# Patient Record
Sex: FEMALE | Race: WHITE | Employment: PART TIME | URBAN - METROPOLITAN AREA
[De-identification: names, ages, dates, MRNs, and addresses within clinical notes are randomized per-mention and may not be internally consistent; named-entity substitution may affect disease eponyms.]

---

## 2019-07-16 ENCOUNTER — HOSPITAL ENCOUNTER (OUTPATIENT)
Dept: MAMMOGRAPHY | Age: 54
Discharge: HOME | End: 2019-07-16
Payer: OTHER GOVERNMENT

## 2019-07-16 DIAGNOSIS — Z12.39 BREAST CANCER SCREENING: ICD-10-CM

## 2019-07-16 PROCEDURE — 77067 SCR MAMMO BI INCL CAD: CPT

## 2019-07-31 ENCOUNTER — HOSPITAL ENCOUNTER (OUTPATIENT)
Dept: WOMENS IMAGING | Age: 54
Discharge: HOME | End: 2019-07-31
Payer: OTHER GOVERNMENT

## 2019-07-31 DIAGNOSIS — R92.8 ABNORMAL MAMMOGRAM: ICD-10-CM

## 2019-07-31 PROCEDURE — 76642 ULTRASOUND BREAST LIMITED: CPT

## 2019-07-31 PROCEDURE — 77066 DX MAMMO INCL CAD BI: CPT

## 2020-02-03 ENCOUNTER — HOSPITAL ENCOUNTER (OUTPATIENT)
Dept: WOMENS IMAGING | Age: 55
Discharge: HOME | End: 2020-02-03
Payer: OTHER GOVERNMENT

## 2020-02-03 PROCEDURE — G0279 TOMOSYNTHESIS, MAMMO: HCPCS

## 2020-08-07 ENCOUNTER — TELEPHONE (OUTPATIENT)
Dept: WOMENS IMAGING | Age: 55
End: 2020-08-07

## 2020-08-07 NOTE — TELEPHONE ENCOUNTER
Patient called to ask if she should reschedule mammogram as she wishes to not wear a mask. I told her it is our policy that we require all patients wear a mask. Patient voiced her dislike of this requirement.     Rafat White RT (R) (M)

## 2022-08-16 ENCOUNTER — APPOINTMENT (OUTPATIENT)
Dept: GENERAL RADIOLOGY | Age: 57
End: 2022-08-16
Payer: OTHER GOVERNMENT

## 2022-08-16 ENCOUNTER — HOSPITAL ENCOUNTER (EMERGENCY)
Age: 57
Discharge: HOME | End: 2022-08-16
Payer: OTHER GOVERNMENT

## 2022-08-16 VITALS
RESPIRATION RATE: 18 BRPM | SYSTOLIC BLOOD PRESSURE: 157 MMHG | HEIGHT: 70 IN | OXYGEN SATURATION: 96 % | TEMPERATURE: 97.6 F | DIASTOLIC BLOOD PRESSURE: 81 MMHG | HEART RATE: 86 BPM | BODY MASS INDEX: 29.35 KG/M2 | WEIGHT: 205 LBS

## 2022-08-16 DIAGNOSIS — M25.512 ACUTE PAIN OF LEFT SHOULDER: Primary | ICD-10-CM

## 2022-08-16 PROCEDURE — 99283 EMERGENCY DEPT VISIT LOW MDM: CPT

## 2022-08-16 PROCEDURE — 6370000000 HC RX 637 (ALT 250 FOR IP): Performed by: NURSE PRACTITIONER

## 2022-08-16 PROCEDURE — 73030 X-RAY EXAM OF SHOULDER: CPT

## 2022-08-16 RX ORDER — ASPIRIN 81 MG/1
81 TABLET, CHEWABLE ORAL DAILY
COMMUNITY

## 2022-08-16 RX ORDER — LOSARTAN POTASSIUM 25 MG/1
25 TABLET ORAL DAILY
COMMUNITY

## 2022-08-16 RX ORDER — NAPROXEN 250 MG/1
250 TABLET ORAL 2 TIMES DAILY WITH MEALS
Qty: 60 TABLET | Refills: 0 | Status: SHIPPED | OUTPATIENT
Start: 2022-08-16

## 2022-08-16 RX ORDER — HYDROCODONE BITARTRATE AND ACETAMINOPHEN 5; 325 MG/1; MG/1
1 TABLET ORAL ONCE
Status: COMPLETED | OUTPATIENT
Start: 2022-08-16 | End: 2022-08-16

## 2022-08-16 RX ORDER — CETIRIZINE HYDROCHLORIDE 10 MG/1
CAPSULE, LIQUID FILLED ORAL
COMMUNITY

## 2022-08-16 RX ADMIN — HYDROCODONE BITARTRATE AND ACETAMINOPHEN 1 TABLET: 5; 325 TABLET ORAL at 13:46

## 2022-08-16 ASSESSMENT — ENCOUNTER SYMPTOMS
BLOOD IN STOOL: 0
ABDOMINAL PAIN: 0
DIARRHEA: 0
RHINORRHEA: 0
NAUSEA: 0
EYE PAIN: 0
SORE THROAT: 0
COUGH: 0
BACK PAIN: 0
SHORTNESS OF BREATH: 0
VOMITING: 0

## 2022-08-16 ASSESSMENT — PAIN SCALES - GENERAL
PAINLEVEL_OUTOF10: 5
PAINLEVEL_OUTOF10: 8

## 2022-08-16 ASSESSMENT — PAIN DESCRIPTION - LOCATION: LOCATION: SHOULDER

## 2022-08-16 ASSESSMENT — PAIN DESCRIPTION - ORIENTATION: ORIENTATION: RIGHT

## 2022-08-16 ASSESSMENT — PAIN - FUNCTIONAL ASSESSMENT: PAIN_FUNCTIONAL_ASSESSMENT: 0-10

## 2022-08-16 NOTE — ED TRIAGE NOTES
[address] ED  EMERGENCY DEPARTMENT ENCOUNTER        Pt Name: Dayanna Sauceda  MRN: [id number]  [place] 1965  Date of evaluation: 8/16/2022  Provider: TIFFANY Holt - FILIBERTO  PCP: Waldemar ANTOINE  Note Started: 1:52 PM EDT      BEVERLY. I have evaluated this patient. My supervising physician was available for consultation. Triage CHIEF COMPLAINT       Chief Complaint   Patient presents with    Arm Pain     Via squad. Patient fell on a slippery sidewalk and landed on her right side, pain in right shoulder. Denies hitting head. HISTORY OF PRESENT ILLNESS   (Location/Symptom, Timing/Onset, Context/Setting, Quality, Duration, Modifying Factors, Severity)  Note limiting factors. Chief Complaint: Right shoulder pain after fall    Dayanna Sauceda is a 62 y.o. female who presents to the emergency department symptoms of right shoulder pain after a mechanical fall. Patient reported stepping over a curb and slipping causing her to fall to the ground. States that she landed on her right shoulder and since then had pain. Denies any symptoms of elbow pain or wrist pain. No numbness no tingling. States she does have pain with range of motion of the shoulder. Denies any head or neck injury. No neck pain. No chest or abdominal injury. No hip or pelvic pain. She is able to [facility] without difficulty. Nursing Notes were all reviewed and agreed with or any disagreements were addressed in the HPI. REVIEW OF SYSTEMS    (2-9 systems for level 4, 10 or more for level 5)     Review of Systems   Constitutional:  Negative for chills, diaphoresis and fever. HENT:  Negative for congestion, ear pain, rhinorrhea and sore throat. Eyes:  Negative for pain and visual disturbance. Respiratory:  Negative for cough and shortness of breath. Cardiovascular:  Negative for chest pain and leg swelling.    Gastrointestinal:  Negative for abdominal pain, blood in stool, diarrhea, nausea and vomiting. Genitourinary:  Negative for difficulty urinating, dysuria, flank pain and frequency. Musculoskeletal:  Negative for back pain and neck pain. Right shoulder pain      Skin:  Negative for rash and wound. Neurological:  Negative for dizziness and light-headedness. PAST MEDICAL HISTORY     Past Medical History:   Diagnosis Date    High blood pressure        SURGICAL HISTORY     Past Surgical History:   Procedure Laterality Date    BACK SURGERY      MANDIBLE FRACTURE SURGERY      SHOULDER SURGERY Left 01/01/2010    SHOULDER SURGERY Right 2015    TUBAL LIGATION         CURRENTMEDICATIONS       Previous Medications    ASCORBIC ACID (VITAMIN C) 250 MG TABLET    Take 500 mg by mouth daily    ASPIRIN 81 MG CHEWABLE TABLET    Take 81 mg by mouth in the morning. CETIRIZINE HCL (ZYRTEC ALLERGY) 10 MG CAPS    Take by mouth    LOSARTAN (COZAAR) 25 MG TABLET    Take 25 mg by mouth in the morning. NORCO 5-325 MG PER TABLET    Take 1 tablet by mouth every 6 hours as needed for Pain    NORCO 5-325 MG PER TABLET    Take 1 tablet by mouth every 6 hours as needed for Pain    PROBIOTIC PRODUCT (PRO-BIOTIC BLEND) CAPS    Take by mouth    TRIAMTERENE-HYDROCHLOROTHIAZIDE (MAXZIDE-25) 37.5-25 MG PER TABLET    Take 1 tablet by mouth daily    ZINC-VITAMIN C PO    Take by mouth       ALLERGIES     Pcn [penicillins]    FAMILYHISTORY     History reviewed. No pertinent family history.      SOCIAL HISTORY       Social History     Socioeconomic History    Marital status:      Spouse name: None    Number of children: None    Years of education: None    Highest education level: None   Tobacco Use    Smoking status: Never    Smokeless tobacco: Never   Substance and Sexual Activity    Alcohol use: Yes     Comment: Occasionally    Drug use: No       SCREENINGS    Ralph Coma Scale  Eye Opening: Spontaneous  Best Verbal Response: Oriented  Best Motor Response: Obeys commands  Ralph Coma Scale Score: 15 PHYSICAL EXAM    (up to 7 for level 4, 8 or more for level 5)     ED Triage Vitals [08/16/22 1324]   BP Temp Temp Source Heart Rate Resp SpO2 Height Weight   (!) 195/100 97.6 °F (36.4 °C) Oral 95 18 99 % 5' 10\" (1.778 m) 205 lb (93 kg)       Physical Exam  Vitals and nursing note reviewed. Constitutional:       Appearance: Normal appearance. She is not toxic-appearing or diaphoretic. HENT:      Head: Normocephalic and atraumatic. Nose: Nose normal.   Eyes:      General:         Right eye: No discharge. Left eye: No discharge. Cardiovascular:      Rate and Rhythm: Normal rate and regular rhythm. Pulmonary:      Effort: Pulmonary effort is normal. No respiratory distress. Breath sounds: No wheezing or rhonchi. Abdominal:      Palpations: Abdomen is soft. Tenderness: There is no abdominal tenderness. There is no guarding or rebound. Musculoskeletal:         General: Normal range of motion. Right shoulder: Tenderness present. No swelling or deformity. Normal pulse. Right elbow: Normal.      Left elbow: Normal.      Right wrist: Normal.      Left wrist: Normal.      Cervical back: Normal range of motion and neck supple. Right lower leg: No edema. Left lower leg: No edema. Comments: 2+ radial pulse right arm    Skin:     General: Skin is warm and dry. Capillary Refill: Capillary refill takes less than 2 seconds. Neurological:      General: No focal deficit present. Mental Status: She is alert and oriented to person, place, and time. Psychiatric:         Mood and Affect: Mood normal.         Behavior: Behavior normal.       DIAGNOSTIC RESULTS   LABS:    Labs Reviewed - No data to display    When ordered, only abnormal lab results are displayed. All other labs were within normal range or not returned as of this dictation. EKG:  When ordered, EKG's are interpreted by the Emergency Department Physician in the absence of a cardiologist.  Please see their note for interpretation of EKG. RADIOLOGY:   Non-plain film images such as CT, Ultrasound and MRI are read by the radiologist. Plain radiographic images are visualized andpreliminarily interpreted by the  ED Provider with the below findings:        Interpretation perthe Radiologist below, if available at the time of this note:    XR SHOULDER RIGHT (MIN 2 VIEWS)   Final Result   No acute abnormality. No results found. PROCEDURES   Unless otherwise noted below, none     Procedures    CRITICAL CARE TIME   N/A    CONSULTS:  None      EMERGENCY DEPARTMENT COURSE and DIFFERENTIAL DIAGNOSIS/MDM:   Vitals:    Vitals:    08/16/22 1324   BP: (!) 195/100   Pulse: 95   Resp: 18   Temp: 97.6 °F (36.4 °C)   TempSrc: Oral   SpO2: 99%   Weight: 205 lb (93 kg)   Height: 5' 10\" (1.778 m)       Patient was given thefollowing medications:  Medications   HYDROcodone-acetaminophen (NORCO) 5-325 MG per tablet 1 tablet (1 tablet Oral Given 8/16/22 1346)         Is this patient to be included in the SEP-1 Core Measure due to severe sepsis or septic shock? No   Exclusion criteria - the patient is NOT to be included for SEP-1 Core Measure due to: Infection is not suspected    Patient is noted to have a negative x-ray of the shoulder. The patient has no elbow pain or wrist pain. No rash intact. No obvious fracture. No malalignment. The patient will be treated with anti-inflammatories and shoulder sling. Did discuss with her on proper sling placement and the need for mild range of motion of the shoulder to prevent frozen shoulder. Patient provided follow-up with Ortho. Also advised to return back to the ED for any further acute concerns. Verbalized understanding and agrees. FINAL IMPRESSION      1.  Acute pain of left shoulder          DISPOSITION/PLAN   DISPOSITION Discharge - Pending Orders Complete 08/16/2022 02:23:07 PM      PATIENT REFERREDTO:  Abbie Newby    Go to       [address] [place], [address] [facility]  [phone]    Schedule an appointment as soon as possible for a visit       DISCHARGE MEDICATIONS:  New Prescriptions    NAPROXEN (NAPROSYN) 250 MG TABLET    Take 1 tablet by mouth in the morning and 1 tablet in the evening. Take with meals.        DISCONTINUED MEDICATIONS:  Discontinued Medications    MELOXICAM (MOBIC) 15 MG TABLET    Take 1 tablet by mouth daily Take one tab 15 mg by mouth daily with food              (Please note that portions ofthis note were completed with a voice recognition program.  Efforts were made to edit the dictations but occasionally words are mis-transcribed.)    TIFFANY Guerrero CNP (electronically signed)

## 2022-08-16 NOTE — ED NOTES
Discharge instructions reviewed with patient. Patient verbalized understanding. All home medications have been reviewed, questions answered and patient voiced understanding. Given prescriptions, discharge instructions, and appointment times. Sling applied to right arm.       Naheed Bunn RN  08/16/22 5211

## 2022-08-16 NOTE — ED PROVIDER NOTES
[facility] [address] ED  EMERGENCY DEPARTMENT ENCOUNTER          Pt Name: Theresa Bejarano  MRN: [id number]  [place] 1965  Date of evaluation: 8/16/2022  Provider: TIFFANY Bennett CNP  PCP: Nellie ANTOINE  Note Started: 1:52 PM EDT        BEVERLY. I have evaluated this patient. My supervising physician was available for consultation. Triage CHIEF COMPLAINT             Chief Complaint   Patient presents with    Arm Pain       Via squad. Patient fell on a slippery sidewalk and landed on her right side, pain in right shoulder. Denies hitting head. HISTORY OF PRESENT ILLNESS   (Location/Symptom, Timing/Onset, Context/Setting, Quality, Duration, Modifying Factors, Severity)  Note limiting factors. Chief Complaint: Right shoulder pain after fall     Theresa Bejarano is a 62 y.o. female who presents to the emergency department symptoms of right shoulder pain after a mechanical fall. Patient reported stepping over a curb and slipping causing her to fall to the ground. States that she landed on her right shoulder and since then had pain. Denies any symptoms of elbow pain or wrist pain.   No numbness no tingling. States she does have pain with range of motion of the shoulder. Denies any head or neck injury. No neck pain. No chest or abdominal injury. No hip or pelvic pain. She is able to [facility] without difficulty. Nursing Notes were all reviewed and agreed with or any disagreements were addressed in the HPI. REVIEW OF SYSTEMS    (2-9 systems for level 4, 10 or more for level 5)      Review of Systems  Constitutional:  Negative for chills, diaphoresis and fever. HENT:  Negative for congestion, ear pain, rhinorrhea and sore throat. Eyes:  Negative for pain and visual disturbance. Respiratory:  Negative for cough and shortness of breath. Cardiovascular:  Negative for chest pain and leg swelling. Gastrointestinal:  Negative for abdominal pain, blood in stool, diarrhea, nausea and vomiting. Genitourinary:  Negative for difficulty urinating, dysuria, flank pain and frequency. Musculoskeletal:  Negative for back pain and neck pain. Right shoulder pain      Skin:  Negative for rash and wound. Neurological:  Negative for dizziness and light-headedness. PAST MEDICAL HISTORY      Past Medical History        Past Medical History:   Diagnosis Date    High blood pressure              SURGICAL HISTORY      Past Surgical History         Past Surgical History:   Procedure Laterality Date    BACK SURGERY        MANDIBLE FRACTURE SURGERY        SHOULDER SURGERY Left 01/01/2010    SHOULDER SURGERY Right 2015    TUBAL LIGATION                CURRENTMEDICATIONS             Previous Medications     ASCORBIC ACID (VITAMIN C) 250 MG TABLET    Take 500 mg by mouth daily     ASPIRIN 81 MG CHEWABLE TABLET    Take 81 mg by mouth in the morning. CETIRIZINE HCL (ZYRTEC ALLERGY) 10 MG CAPS    Take by mouth     LOSARTAN (COZAAR) 25 MG TABLET    Take 25 mg by mouth in the morning.      NORCO 5-325 MG PER TABLET    Take 1 tablet by mouth every 6 hours as needed for Pain     NORCO 5-325 MG PER TABLET    Take 1 tablet by mouth every 6 hours as needed for Pain     PROBIOTIC PRODUCT (PRO-BIOTIC BLEND) CAPS    Take by mouth     TRIAMTERENE-HYDROCHLOROTHIAZIDE (MAXZIDE-25) 37.5-25 MG PER TABLET    Take 1 tablet by mouth daily     ZINC-VITAMIN C PO    Take by mouth         ALLERGIES     Pcn [penicillins]     FAMILYHISTORY     Family History   History reviewed. No pertinent family history. SOCIAL HISTORY        Social History   Social History            Socioeconomic History    Marital status:        Spouse name: None    Number of children: None    Years of education: None    Highest education level: None   Tobacco Use    Smoking status: Never    Smokeless tobacco: Never   Substance and Sexual Activity    Alcohol use: Yes       Comment: Occasionally    Drug use: No            SCREENINGS    [place] Coma Scale  Eye Opening: Spontaneous  Best Verbal Response: Oriented  Best Motor Response: Obeys commands  Ralph Coma Scale Score: 15          PHYSICAL EXAM    (up to 7 for level 4, 8 or more for level 5)                ED Triage Vitals [08/16/22 1324]   BP Temp Temp Source Heart Rate Resp SpO2 Height Weight   (!) 195/100 97.6 °F (36.4 °C) Oral 95 18 99 % 5' 10\" (1.778 m) 205 lb (93 kg)         Physical Exam  Vitals and nursing note reviewed. Constitutional:       Appearance: Normal appearance. She is not toxic-appearing or diaphoretic. HENT:     Head: Normocephalic and atraumatic. Nose: Nose normal.  Eyes:     General:         Right eye: No discharge. Left eye: No discharge. Cardiovascular:     Rate and Rhythm: Normal rate and regular rhythm. Pulmonary:     Effort: Pulmonary effort is normal. No respiratory distress. Breath sounds: No wheezing or rhonchi. Abdominal:     Palpations: Abdomen is soft. Tenderness: There is no abdominal tenderness. There is no guarding or rebound. Musculoskeletal:         General: Normal range of motion. Right shoulder: Tenderness present.  No swelling or deformity. Normal pulse. Right elbow: Normal.     Left elbow: Normal.     Right wrist: Normal.     Left wrist: Normal.     Cervical back: Normal range of motion and neck supple. Right lower leg: No edema. Left lower leg: No edema. Comments: 2+ radial pulse right arm    Skin:     General: Skin is warm and dry. Capillary Refill: Capillary refill takes less than 2 seconds. Neurological:     General: No focal deficit present. Mental Status: She is alert and oriented to person, place, and time. Psychiatric:         Mood and Affect: Mood normal.         Behavior: Behavior normal.        DIAGNOSTIC RESULTS   LABS:     Labs Reviewed - No data to display     When ordered, only abnormal lab results are displayed. All other labs were within normal range or not returned as of this dictation. EKG: When ordered, EKG's are interpreted by the Emergency Department Physician in the absence of a cardiologist.  Please see their note for interpretation of EKG. RADIOLOGY:  Non-plain film images such as CT, Ultrasound and MRI are read by the radiologist. Plain radiographic images are visualized andpreliminarily interpreted by the  ED Provider with the below findings:           Interpretation [facility] Radiologist below, if available at the time of this note:     XR SHOULDER RIGHT (MIN 2 VIEWS)   Final Result   No acute abnormality. No results found.         PROCEDURES   Unless otherwise noted below, none     Procedures     CRITICAL CARE TIME   N/A     CONSULTS:  None        EMERGENCY DEPARTMENT COURSE and DIFFERENTIAL DIAGNOSIS/MDM:   Vitals:    Vitals       Vitals:     08/16/22 1324   BP: (!) 195/100   Pulse: 95   Resp: 18   Temp: 97.6 °F (36.4 °C)   TempSrc: Oral   SpO2: 99%   Weight: 205 lb (93 kg)   Height: 5' 10\" (1.778 m)            Patient was given thefollowing medications:  Medications   HYDROcodone-acetaminophen (NORCO) 5-325 MG per tablet 1 tablet (1 tablet Oral Given 8/16/22 1346) Is this patient to be included in the SEP-1 Core Measure due to severe sepsis or septic shock? No   Exclusion criteria - the patient is NOT to be included for SEP-1 Core Measure due to: Infection is not suspected     Patient is noted to have a negative x-ray of the shoulder. The patient has no elbow pain or wrist pain. No rash intact. No obvious fracture. No malalignment. The patient will be treated with anti-inflammatories and shoulder sling. Did discuss with her on proper sling placement and the need for mild range of motion of the shoulder to prevent frozen shoulder. Patient provided follow-up with Ortho. Also advised to return back to the ED for any further acute concerns. Verbalized understanding and agrees. FINAL IMPRESSION       1. Acute pain of left shoulder           DISPOSITION/PLAN   DISPOSITION Discharge - Pending Orders Complete 08/16/2022 02:23:07 PM        PATIENT REFERREDTO:  Dale ALVARADO Keyonna     Go to        [facility], [address]  [address] [address]   [phone]     Schedule an appointment as soon as possible for a visit         DISCHARGE MEDICATIONS:       New Prescriptions     NAPROXEN (NAPROSYN) 250 MG TABLET    Take 1 tablet by mouth in the morning and 1 tablet in the evening. Take with meals.          DISCONTINUED MEDICATIONS:       Discontinued Medications     MELOXICAM (MOBIC) 15 MG TABLET    Take 1 tablet by mouth daily Take one tab 15 mg by mouth daily with food               (Please note that portions ofthis note were completed with a voice recognition program.  Efforts were made to edit the dictations but occasionally words are mis-transcribed.)     TIFFANY Coleman CNP (electronically signed)                       TIFFANY Coleman CNP  08/16/22 6048

## 2022-08-16 NOTE — Clinical Note
Vasiliy Stanley was seen and treated in our emergency department on 8/16/2022. She may return to work on 08/18/2022. If you have any questions or concerns, please don't hesitate to call.       Jan Hurd, TIFFANY - CNP

## 2025-06-04 ENCOUNTER — OFFICE VISIT (OUTPATIENT)
Dept: INTERNAL MEDICINE CLINIC | Age: 60
End: 2025-06-04
Payer: COMMERCIAL

## 2025-06-04 VITALS
DIASTOLIC BLOOD PRESSURE: 80 MMHG | SYSTOLIC BLOOD PRESSURE: 128 MMHG | BODY MASS INDEX: 30.92 KG/M2 | WEIGHT: 216 LBS | HEIGHT: 70 IN

## 2025-06-04 DIAGNOSIS — G89.29 CHRONIC RIGHT-SIDED LOW BACK PAIN WITHOUT SCIATICA: Primary | Chronic | ICD-10-CM

## 2025-06-04 DIAGNOSIS — I10 ESSENTIAL HYPERTENSION: Chronic | ICD-10-CM

## 2025-06-04 DIAGNOSIS — M54.50 CHRONIC RIGHT-SIDED LOW BACK PAIN WITHOUT SCIATICA: Primary | Chronic | ICD-10-CM

## 2025-06-04 DIAGNOSIS — J30.2 SEASONAL ALLERGIES: ICD-10-CM

## 2025-06-04 DIAGNOSIS — R91.1 LEFT LOWER LOBE PULMONARY NODULE: Chronic | ICD-10-CM

## 2025-06-04 DIAGNOSIS — Z82.49 FAMILY HISTORY OF HEART DISEASE: Chronic | ICD-10-CM

## 2025-06-04 DIAGNOSIS — Z00.00 PREVENTATIVE HEALTH CARE: ICD-10-CM

## 2025-06-04 PROCEDURE — G8417 CALC BMI ABV UP PARAM F/U: HCPCS | Performed by: HOSPITALIST

## 2025-06-04 PROCEDURE — 99205 OFFICE O/P NEW HI 60 MIN: CPT | Performed by: HOSPITALIST

## 2025-06-04 PROCEDURE — 3017F COLORECTAL CA SCREEN DOC REV: CPT | Performed by: HOSPITALIST

## 2025-06-04 PROCEDURE — 4004F PT TOBACCO SCREEN RCVD TLK: CPT | Performed by: HOSPITALIST

## 2025-06-04 PROCEDURE — 3079F DIAST BP 80-89 MM HG: CPT | Performed by: HOSPITALIST

## 2025-06-04 PROCEDURE — 3074F SYST BP LT 130 MM HG: CPT | Performed by: HOSPITALIST

## 2025-06-04 PROCEDURE — G2211 COMPLEX E/M VISIT ADD ON: HCPCS | Performed by: HOSPITALIST

## 2025-06-04 PROCEDURE — G8427 DOCREV CUR MEDS BY ELIG CLIN: HCPCS | Performed by: HOSPITALIST

## 2025-06-04 RX ORDER — LOSARTAN POTASSIUM 25 MG/1
25 TABLET ORAL DAILY
Qty: 90 TABLET | Refills: 3 | Status: SHIPPED | OUTPATIENT
Start: 2025-06-04

## 2025-06-04 RX ORDER — CETIRIZINE HYDROCHLORIDE 10 MG/1
10 CAPSULE, LIQUID FILLED ORAL DAILY
Qty: 90 CAPSULE | Refills: 3 | Status: SHIPPED | OUTPATIENT
Start: 2025-06-04

## 2025-06-04 SDOH — ECONOMIC STABILITY: FOOD INSECURITY: WITHIN THE PAST 12 MONTHS, YOU WORRIED THAT YOUR FOOD WOULD RUN OUT BEFORE YOU GOT MONEY TO BUY MORE.: NEVER TRUE

## 2025-06-04 SDOH — ECONOMIC STABILITY: FOOD INSECURITY: WITHIN THE PAST 12 MONTHS, THE FOOD YOU BOUGHT JUST DIDN'T LAST AND YOU DIDN'T HAVE MONEY TO GET MORE.: NEVER TRUE

## 2025-06-04 ASSESSMENT — PATIENT HEALTH QUESTIONNAIRE - PHQ9
SUM OF ALL RESPONSES TO PHQ QUESTIONS 1-9: 0
SUM OF ALL RESPONSES TO PHQ QUESTIONS 1-9: 0
2. FEELING DOWN, DEPRESSED OR HOPELESS: NOT AT ALL
SUM OF ALL RESPONSES TO PHQ QUESTIONS 1-9: 0
SUM OF ALL RESPONSES TO PHQ QUESTIONS 1-9: 0
1. LITTLE INTEREST OR PLEASURE IN DOING THINGS: NOT AT ALL

## 2025-06-04 ASSESSMENT — ENCOUNTER SYMPTOMS
BACK PAIN: 1
SINUS PAIN: 0
VOMITING: 0
NAUSEA: 0
SHORTNESS OF BREATH: 0
VOICE CHANGE: 0
BLOOD IN STOOL: 0
SORE THROAT: 0
ABDOMINAL PAIN: 0
WHEEZING: 0
SINUS PRESSURE: 0
COUGH: 0
ABDOMINAL DISTENTION: 0
DIARRHEA: 0
CONSTIPATION: 0
CHEST TIGHTNESS: 0
TROUBLE SWALLOWING: 0

## 2025-06-04 NOTE — PROGRESS NOTES
[facility]  Establish care visit   2025    Lori New (:  1965) is a 60 y.o. female, here to establish care.    No chief complaint on file.       Patient Active Problem List   Diagnosis    Tendinitis of right rotator cuff    Biceps tendinitis on right    Arthritis of right acromioclavicular joint    Neck muscle spasm    Neck arthritis C5, C6, C7    Right rotator cuff tear       HPI    The patient is here to establish care with me.  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  The patient is unmarried.  She has 3 adult children.  Her youngest daughter age 25 lives with her.  The patient is a never smoker.  The patient drinks alcohol perhaps once monthly.  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          Chronic problem  Chronic right lower back pain  The patient complains of right lower back pain.  This is a chronic intermittent problem, but the patient is experiencing a current flareup.  It may have been precipitated by some chiropractic manipulation done on 2025.  The patient is describing a nonradiating throbbing pain when seated.  This can be a sharp shooting pain with certain musculoskeletal position change but it does not involve the legs.  The patient has not tried any ibuprofen, Aleve, Tylenol, or other medications for the pain.  The patient has tried alternating ice and heat.  She is also tried a TENS unit.  Both treatments are somewhat helpful.  Essential hypertension  This is a chronic problem.  The disease course is stable.  Current therapies include lifestyle modification and pharmacologic therapy.  The patient is using losartan 25 mg p.o. daily without side effect..  Pertinent negatives include no leg swelling, headaches, visual problems.    Family history of heart disease  Mother  of a

## 2025-07-07 DIAGNOSIS — J30.2 SEASONAL ALLERGIES: ICD-10-CM

## 2025-07-07 DIAGNOSIS — I10 ESSENTIAL HYPERTENSION: Chronic | ICD-10-CM

## 2025-07-07 RX ORDER — CETIRIZINE HYDROCHLORIDE 10 MG/1
10 CAPSULE, LIQUID FILLED ORAL DAILY
Qty: 90 CAPSULE | Refills: 3 | Status: CANCELLED | OUTPATIENT
Start: 2025-07-07

## 2025-07-07 RX ORDER — LOSARTAN POTASSIUM 25 MG/1
25 TABLET ORAL DAILY
Qty: 90 TABLET | Refills: 3 | Status: CANCELLED | OUTPATIENT
Start: 2025-07-07

## 2025-07-08 ENCOUNTER — TELEPHONE (OUTPATIENT)
Dept: INTERNAL MEDICINE CLINIC | Age: 60
End: 2025-07-08

## 2025-07-08 DIAGNOSIS — J30.2 SEASONAL ALLERGIES: ICD-10-CM

## 2025-07-08 DIAGNOSIS — I10 ESSENTIAL HYPERTENSION: Chronic | ICD-10-CM

## 2025-07-08 RX ORDER — CETIRIZINE HYDROCHLORIDE 10 MG/1
10 CAPSULE, LIQUID FILLED ORAL DAILY
Qty: 90 CAPSULE | Refills: 1 | Status: SHIPPED | OUTPATIENT
Start: 2025-07-08

## 2025-07-08 RX ORDER — LOSARTAN POTASSIUM 25 MG/1
25 TABLET ORAL DAILY
Qty: 90 TABLET | Refills: 1 | Status: SHIPPED | OUTPATIENT
Start: 2025-07-08

## 2025-07-08 NOTE — TELEPHONE ENCOUNTER
Pt called upset due to her medication request from yesterday not being sent in to the pharmacy yet. Pt is out of medication and this I her blood pressure medication. Pt is requesting to speak with a supervisor about this. Pt was advised that doctor gets 24 to 48 hours to fill a medication question. Pt was also advised of the message left by MA on the the refill request from yesterday.     [phone]      losartan (COZAAR) 25 MG tablet [[id number]]     Cetirizine HCl (ZYRTEC ALLERGY) 10 MG CAPS [[id number]]

## 2025-07-21 ENCOUNTER — HOSPITAL ENCOUNTER (EMERGENCY)
Age: 60
Discharge: HOME | End: 2025-07-21
Attending: EMERGENCY MEDICINE
Payer: COMMERCIAL

## 2025-07-21 ENCOUNTER — APPOINTMENT (OUTPATIENT)
Dept: GENERAL RADIOLOGY | Age: 60
End: 2025-07-21
Payer: COMMERCIAL

## 2025-07-21 ENCOUNTER — OFFICE VISIT (OUTPATIENT)
Age: 60
End: 2025-07-21

## 2025-07-21 ENCOUNTER — TELEPHONE (OUTPATIENT)
Dept: INTERNAL MEDICINE CLINIC | Age: 60
End: 2025-07-21

## 2025-07-21 ENCOUNTER — APPOINTMENT (OUTPATIENT)
Dept: CT IMAGING | Age: 60
End: 2025-07-21
Payer: COMMERCIAL

## 2025-07-21 VITALS
SYSTOLIC BLOOD PRESSURE: 150 MMHG | TEMPERATURE: 97.7 F | BODY MASS INDEX: 32.07 KG/M2 | HEIGHT: 69 IN | WEIGHT: 216.5 LBS | OXYGEN SATURATION: 98 % | RESPIRATION RATE: 16 BRPM | DIASTOLIC BLOOD PRESSURE: 90 MMHG | HEART RATE: 68 BPM

## 2025-07-21 DIAGNOSIS — R10.84 GENERALIZED ABDOMINAL PAIN: Primary | ICD-10-CM

## 2025-07-21 DIAGNOSIS — R10.11 RUQ ABDOMINAL PAIN: Primary | ICD-10-CM

## 2025-07-21 DIAGNOSIS — R03.0 ELEVATED BLOOD PRESSURE READING WITHOUT DIAGNOSIS OF HYPERTENSION: ICD-10-CM

## 2025-07-21 DIAGNOSIS — M54.50 MIDLINE LOW BACK PAIN WITHOUT SCIATICA, UNSPECIFIED CHRONICITY: ICD-10-CM

## 2025-07-21 DIAGNOSIS — R10.11 ABDOMINAL PAIN, RIGHT UPPER QUADRANT: ICD-10-CM

## 2025-07-21 LAB
ALBUMIN SERPL-MCNC: 3.9 G/DL (ref 3.4–5)
ALBUMIN/GLOB SERPL: 1.1 {RATIO} (ref 1.1–2.2)
ALP SERPL-CCNC: 93 U/L (ref 40–129)
ALT SERPL-CCNC: 26 U/L (ref 10–40)
ANION GAP SERPL CALCULATED.3IONS-SCNC: 11 MMOL/L (ref 3–16)
AST SERPL-CCNC: 25 U/L (ref 15–37)
BACTERIA URNS QL MICRO: ABNORMAL /HPF
BASOPHILS # BLD: 0.1 K/UL (ref 0–0.2)
BASOPHILS NFR BLD: 0.7 %
BILIRUB SERPL-MCNC: 0.3 MG/DL (ref 0–1)
BILIRUB UR QL STRIP.AUTO: NEGATIVE
BILIRUBIN, POC: NEGATIVE
BLOOD URINE, POC: NEGATIVE
BUN SERPL-MCNC: 13 MG/DL (ref 7–20)
CALCIUM SERPL-MCNC: 8.7 MG/DL (ref 8.3–10.6)
CHLORIDE SERPL-SCNC: 105 MMOL/L (ref 99–110)
CLARITY UR: CLEAR
CLARITY, POC: CLEAR
CO2 SERPL-SCNC: 23 MMOL/L (ref 21–32)
COLOR UR: YELLOW
COLOR, POC: YELLOW
CREAT SERPL-MCNC: 0.7 MG/DL (ref 0.6–1.2)
DEPRECATED RDW RBC AUTO: 14.3 % (ref 12.4–15.4)
EOSINOPHIL # BLD: 0.5 K/UL (ref 0–0.6)
EOSINOPHIL NFR BLD: 6.6 %
EPI CELLS #/AREA URNS HPF: ABNORMAL /HPF (ref 0–5)
GFR SERPLBLD CREATININE-BSD FMLA CKD-EPI: >90 ML/MIN/{1.73_M2}
GLUCOSE SERPL-MCNC: 81 MG/DL (ref 70–99)
GLUCOSE UR STRIP.AUTO-MCNC: NEGATIVE MG/DL
GLUCOSE URINE, POC: NEGATIVE MG/DL
HCT VFR BLD AUTO: 42.4 % (ref 36–48)
HGB BLD-MCNC: 14.2 G/DL (ref 12–16)
HGB UR QL STRIP.AUTO: NEGATIVE
KETONES UR STRIP.AUTO-MCNC: NEGATIVE MG/DL
KETONES, POC: NEGATIVE MG/DL
LEUKOCYTE EST, POC: NEGATIVE
LEUKOCYTE ESTERASE UR QL STRIP.AUTO: NEGATIVE
LIPASE SERPL-CCNC: 76 U/L (ref 13–60)
LYMPHOCYTES # BLD: 2.1 K/UL (ref 1–5.1)
LYMPHOCYTES NFR BLD: 27.8 %
MCH RBC QN AUTO: 30 PG (ref 26–34)
MCHC RBC AUTO-ENTMCNC: 33.5 G/DL (ref 31–36)
MCV RBC AUTO: 89.4 FL (ref 80–100)
MONOCYTES # BLD: 0.6 K/UL (ref 0–1.3)
MONOCYTES NFR BLD: 7.9 %
MUCOUS THREADS #/AREA URNS LPF: ABNORMAL /LPF
NEUTROPHILS # BLD: 4.3 K/UL (ref 1.7–7.7)
NEUTROPHILS NFR BLD: 57 %
NITRITE UR QL STRIP.AUTO: NEGATIVE
NITRITE, POC: NEGATIVE
PH UR STRIP.AUTO: 6 [PH] (ref 5–8)
PH, POC: 5.5
PLATELET # BLD AUTO: 230 K/UL (ref 135–450)
PMV BLD AUTO: 8.5 FL (ref 5–10.5)
POTASSIUM SERPL-SCNC: 4.3 MMOL/L (ref 3.5–5.1)
PROT SERPL-MCNC: 7.3 G/DL (ref 6.4–8.2)
PROT UR STRIP.AUTO-MCNC: NEGATIVE MG/DL
PROTEIN, POC: NEGATIVE MG/DL
RBC # BLD AUTO: 4.74 M/UL (ref 4–5.2)
RBC #/AREA URNS HPF: ABNORMAL /HPF (ref 0–4)
SODIUM SERPL-SCNC: 139 MMOL/L (ref 136–145)
SP GR UR STRIP.AUTO: 1.01 (ref 1–1.03)
SPECIFIC GRAVITY, POC: 1.01
TROPONIN, HIGH SENSITIVITY: <6 NG/L (ref 0–14)
UA DIPSTICK W REFLEX MICRO PNL UR: ABNORMAL
URN SPEC COLLECT METH UR: ABNORMAL
UROBILINOGEN UR STRIP-ACNC: 0.2 E.U./DL
UROBILINOGEN, POC: 0.2 MG/DL
WBC # BLD AUTO: 7.6 K/UL (ref 4–11)
WBC #/AREA URNS HPF: ABNORMAL /HPF (ref 0–5)

## 2025-07-21 PROCEDURE — 85025 COMPLETE CBC W/AUTO DIFF WBC: CPT

## 2025-07-21 PROCEDURE — 96374 THER/PROPH/DIAG INJ IV PUSH: CPT

## 2025-07-21 PROCEDURE — 6360000002 HC RX W HCPCS: Performed by: EMERGENCY MEDICINE

## 2025-07-21 PROCEDURE — 84484 ASSAY OF TROPONIN QUANT: CPT

## 2025-07-21 PROCEDURE — 71045 X-RAY EXAM CHEST 1 VIEW: CPT

## 2025-07-21 PROCEDURE — 81001 URINALYSIS AUTO W/SCOPE: CPT

## 2025-07-21 PROCEDURE — 83690 ASSAY OF LIPASE: CPT

## 2025-07-21 PROCEDURE — 74177 CT ABD & PELVIS W/CONTRAST: CPT

## 2025-07-21 PROCEDURE — 99285 EMERGENCY DEPT VISIT HI MDM: CPT

## 2025-07-21 PROCEDURE — 6360000004 HC RX CONTRAST MEDICATION: Performed by: EMERGENCY MEDICINE

## 2025-07-21 PROCEDURE — 80053 COMPREHEN METABOLIC PANEL: CPT

## 2025-07-21 PROCEDURE — 93005 ELECTROCARDIOGRAM TRACING: CPT | Performed by: EMERGENCY MEDICINE

## 2025-07-21 PROCEDURE — 93010 ELECTROCARDIOGRAM REPORT: CPT | Performed by: INTERNAL MEDICINE

## 2025-07-21 RX ORDER — IOPAMIDOL 755 MG/ML
75 INJECTION, SOLUTION INTRAVASCULAR
Status: COMPLETED | OUTPATIENT
Start: 2025-07-21 | End: 2025-07-21

## 2025-07-21 RX ORDER — CIPROFLOXACIN 500 MG/1
500 TABLET, FILM COATED ORAL 2 TIMES DAILY
Qty: 14 TABLET | Refills: 0 | Status: SHIPPED | OUTPATIENT
Start: 2025-07-21 | End: 2025-07-28

## 2025-07-21 RX ORDER — KETOROLAC TROMETHAMINE 15 MG/ML
30 INJECTION, SOLUTION INTRAMUSCULAR; INTRAVENOUS ONCE
Status: COMPLETED | OUTPATIENT
Start: 2025-07-21 | End: 2025-07-21

## 2025-07-21 RX ORDER — FLUCONAZOLE 100 MG/1
100 TABLET ORAL DAILY
Qty: 3 TABLET | Refills: 0 | Status: SHIPPED | OUTPATIENT
Start: 2025-07-21 | End: 2025-07-24

## 2025-07-21 RX ORDER — ONDANSETRON 4 MG/1
4 TABLET, FILM COATED ORAL 3 TIMES DAILY PRN
Qty: 15 TABLET | Refills: 0 | Status: SHIPPED | OUTPATIENT
Start: 2025-07-21

## 2025-07-21 RX ORDER — METOCLOPRAMIDE HYDROCHLORIDE 5 MG/ML
10 INJECTION INTRAMUSCULAR; INTRAVENOUS ONCE
Status: DISCONTINUED | OUTPATIENT
Start: 2025-07-21 | End: 2025-07-21 | Stop reason: HOSPADM

## 2025-07-21 RX ORDER — DIPHENHYDRAMINE HYDROCHLORIDE 50 MG/ML
25 INJECTION, SOLUTION INTRAMUSCULAR; INTRAVENOUS ONCE
Status: DISCONTINUED | OUTPATIENT
Start: 2025-07-21 | End: 2025-07-21 | Stop reason: HOSPADM

## 2025-07-21 RX ORDER — METRONIDAZOLE 500 MG/1
500 TABLET ORAL 2 TIMES DAILY
Qty: 14 TABLET | Refills: 0 | Status: SHIPPED | OUTPATIENT
Start: 2025-07-21 | End: 2025-07-28

## 2025-07-21 RX ORDER — OXYCODONE AND ACETAMINOPHEN 5; 325 MG/1; MG/1
1 TABLET ORAL EVERY 6 HOURS PRN
Qty: 12 TABLET | Refills: 0 | Status: SHIPPED | OUTPATIENT
Start: 2025-07-21 | End: 2025-07-24

## 2025-07-21 RX ADMIN — IOPAMIDOL 75 ML: 755 INJECTION, SOLUTION INTRAVENOUS at 17:27

## 2025-07-21 RX ADMIN — KETOROLAC TROMETHAMINE 30 MG: 15 INJECTION, SOLUTION INTRAMUSCULAR; INTRAVENOUS at 17:10

## 2025-07-21 ASSESSMENT — ENCOUNTER SYMPTOMS
CHEST TIGHTNESS: 1
ABDOMINAL PAIN: 1
BACK PAIN: 1
ABDOMINAL DISTENTION: 1

## 2025-07-21 ASSESSMENT — PAIN SCALES - GENERAL: PAINLEVEL_OUTOF10: 7

## 2025-07-21 ASSESSMENT — PAIN DESCRIPTION - DESCRIPTORS: DESCRIPTORS: PRESSURE

## 2025-07-21 ASSESSMENT — PAIN - FUNCTIONAL ASSESSMENT: PAIN_FUNCTIONAL_ASSESSMENT: 0-10

## 2025-07-21 ASSESSMENT — PAIN DESCRIPTION - LOCATION: LOCATION: ABDOMEN;BACK

## 2025-07-21 ASSESSMENT — PAIN DESCRIPTION - ORIENTATION: ORIENTATION: LEFT

## 2025-07-21 NOTE — PROGRESS NOTES
Lori New (: 1965) is a 60 y.o. female, New patient, here for evaluation of the following chief complaint(s):  Back Pain (Pt states feels like her kidneys are painful/Low back pain/X several months/Pt states has been seen for this issue and was told it was her back and was given muscle relaxers /Pt denies urinary sx ) and Abdominal Pain (Pt states for 9-months has had epigastric pressure too)        ASSESSMENT/PLAN:    ICD-10-CM    1. RUQ abdominal pain  R10.11       2. Midline low back pain without sciatica, unspecified chronicity  M54.50 POCT Urinalysis no Micro      3. Elevated blood pressure reading without diagnosis of hypertension  R03.0           Given patient presentation with c/o abdominal pain,flank pain, intermittent indigestion, back pain worse when supine and exam findings of RUQ abdominal pain that radiates to the back and left, a strong positive Quinteros's sign there is a strong suspicion for cholelithiasis. A urinalysis was ordered without concern for UTI. Based on history and physical examination, differential diagnosis includes but is not limited to: Cholecystitis, pancreatitis, hepatitis, pyelonephritis, diverticulitis, bowel obstruction. Given limited resources in the urgent care setting, decision to escalate care to ER made with patient agreeable to plan of care. Offered squad, patient declined. Report called to [facility], [place].         SUBJECTIVE/OBJECTIVE:  HPI:   60 y.o. female presents for complaint of \"my pain in back,kidneys, indigestion upper abdominal pain with pressure but no acid reflux\" for 9 months with some days worse than others. Reports has ongoing kidney issues and is worried that it is related to that, was told in the past that it was muscular and treated for back pain which did not resolve in any way    Admits symptoms include  abdominal pressure, bloating, indigestion, left flank pain  Denies dysuria, trauma, injury, nausea,vomiting,diarrhea

## 2025-07-21 NOTE — ED PROVIDER NOTES
I am not the primary provider of record, however I did receive report from [facility] urgent care via phone at 0529 on 7/21/2025.  Urgent care provider reports that this patient has been endorsing right upper quadrant pain that radiates across the diaphragm, is worse when she eats.  Pain is associated with distention, as well as left flank pain that is worse when laying flat.  Urgent care provider was able to elicit a strong positive Quinteros sign on exam, and had concern for gallbladder processes or ductal obstruction.      Ginna Read PA-C  07/21/25 3813

## 2025-07-21 NOTE — PATIENT INSTRUCTIONS
Unfortunately we are unable to obtain required diagnostic testing such as imaging and labs that are necessary for adequate evaluation. I suspect a Gall bladder issue but cannot rule out liver, bowel, pancreatic issue.   Please go directly to the ER as instructed. Do not eat or drink until you have been evaluated     Lori    Thank you for trusting [facility] [place] with your care. Your decision to come to us means a lot and we are honored to be part of your healthcare journey. We value your trust and hope your experience with us was positive and met your expectations.    We're always looking for ways to improve, and your feedback is incredibly important to us. You will receive a text or email soon asking you how your visit went. for If you could take a moment to share your thoughts, it would mean the world to us. Your input helps us better serve you and others in the community.     Thank you again for choosing us. We're grateful for the opportunity to care for you and your loved ones. We hope to see you again - though we always wish you health and wellness!    Warm regards,    [facility] Team    Chayo Gutierrez MA, [employer], and Lawanda Talbot MA

## 2025-07-21 NOTE — ED PROVIDER NOTES
Emergency Department Attending Physician Note  Location: [facility] EMERGENCY DEPARTMENT  7/21/2025       Pt Name: Lori New  MRN: [id number]  Birthdate 1965    Date of evaluation: 7/21/2025  Provider: JT BRISENO DO  PCP: Bernie Stanton MD    Note Started: 5:09 PM EDT 7/21/25    CHIEF COMPLAINT  Chief Complaint   Patient presents with    Abdominal Pain     States L lower back pain and pain pressure in mid abd. States has been seen multiple times and was told concern for gallbladder issues        ROOM: R2    HISTORY OF PRESENT ILLNESS:  History obtained by patient. Limitations to history : None.     Lori New is a 60 y.o. female with a significant PMHx of high blood pressure, and other comorbidities listed below, here in the emergency department today with 9 months of left flank pain, and epigastric pain that is worse with eating.  She says that she has been trying to put off any intervention or further workup or medications from her PCP, but patient says that her PCP had \"told me that if I ever want a CT scan, just to call the office.\"  She called the office today, and he is out of town.  She then went to urgent care today, and they told her to come to the ER.  She said the flank pain and epigastric pain got worse over the past 24 hours, and now it is constant.  She says the urgent care is concerned is her gallbladder, and she said she is worried about her kidneys in the past.     Nursing Notes were all reviewed and agreed with or any disagreements were addressed in the HPI.      MEDICAL HISTORY  Past Medical History:   Diagnosis Date    High blood pressure         SURGICAL HISTORY  Past Surgical History:   Procedure Laterality Date    BACK SURGERY      MANDIBLE FRACTURE SURGERY      SHOULDER SURGERY Left 01/01/2010    SHOULDER SURGERY Right 2015    TUBAL LIGATION         CURRENT MEDICATIONS  Discharge Medication List as of 7/21/2025  7:06 PM        CONTINUE these medications which

## 2025-07-21 NOTE — TELEPHONE ENCOUNTER
Dr Stanton currently out of office , message will be sent to on call provider  
I do not see any mentioning of kidney imaging on Dr. Stanton's note, recommend to wait until his return to determine if appropriate per their conversation  
Pt requesting a CT of her Kidney. She states this was discussed in her recent visit. Was told if she would like to have this test to call the office and it would be placed for her.   Please advise.   
medication pump(s) used/dexcom

## 2025-07-22 VITALS
SYSTOLIC BLOOD PRESSURE: 138 MMHG | HEART RATE: 75 BPM | TEMPERATURE: 97 F | DIASTOLIC BLOOD PRESSURE: 82 MMHG | OXYGEN SATURATION: 98 %

## 2025-07-22 LAB
EKG ATRIAL RATE: 63 BPM
EKG DIAGNOSIS: NORMAL
EKG P AXIS: 46 DEGREES
EKG P-R INTERVAL: 142 MS
EKG Q-T INTERVAL: 400 MS
EKG QRS DURATION: 80 MS
EKG QTC CALCULATION (BAZETT): 409 MS
EKG R AXIS: 38 DEGREES
EKG T AXIS: 38 DEGREES
EKG VENTRICULAR RATE: 63 BPM

## 2025-07-22 PROCEDURE — 93010 ELECTROCARDIOGRAM REPORT: CPT | Performed by: INTERNAL MEDICINE

## 2025-07-25 ASSESSMENT — ENCOUNTER SYMPTOMS
ABDOMINAL PAIN: 1
BACK PAIN: 0
VOMITING: 1
RHINORRHEA: 0
COUGH: 0
CHEST TIGHTNESS: 0
DIARRHEA: 1
SHORTNESS OF BREATH: 0

## 2025-07-29 ENCOUNTER — TELEPHONE (OUTPATIENT)
Dept: CASE MANAGEMENT | Age: 60
End: 2025-07-29

## 2025-07-29 NOTE — TELEPHONE ENCOUNTER
Chest Xray 7/21/25    8 mm round pulmonary nodule in the left lower lobe.  It is not certain if  this is definitely a calcified granuloma although it does appear dense.  As  there are no prior exams available for comparison recommend follow-up with a CT chest    Amparo HERNÁNDEZ(R)  Patient Navigator  Incidentals/Lung Navigation  [email or web address]

## 2025-09-02 ENCOUNTER — TELEPHONE (OUTPATIENT)
Dept: INTERNAL MEDICINE CLINIC | Age: 60
End: 2025-09-02